# Patient Record
Sex: MALE | Race: WHITE | URBAN - METROPOLITAN AREA
[De-identification: names, ages, dates, MRNs, and addresses within clinical notes are randomized per-mention and may not be internally consistent; named-entity substitution may affect disease eponyms.]

---

## 2018-04-12 ENCOUNTER — OFFICE VISIT (OUTPATIENT)
Dept: URGENT CARE | Facility: URGENT CARE | Age: 43
End: 2018-04-12
Payer: COMMERCIAL

## 2018-04-12 ENCOUNTER — HOSPITAL ENCOUNTER (EMERGENCY)
Facility: CLINIC | Age: 43
Discharge: HOME | End: 2018-04-12
Attending: EMERGENCY MEDICINE | Admitting: EMERGENCY MEDICINE
Payer: COMMERCIAL

## 2018-04-12 VITALS
BODY MASS INDEX: 25.31 KG/M2 | TEMPERATURE: 97.7 F | DIASTOLIC BLOOD PRESSURE: 99 MMHG | SYSTOLIC BLOOD PRESSURE: 131 MMHG | WEIGHT: 191 LBS | HEIGHT: 73 IN | RESPIRATION RATE: 9 BRPM | OXYGEN SATURATION: 97 %

## 2018-04-12 DIAGNOSIS — R06.02 SOB (SHORTNESS OF BREATH): ICD-10-CM

## 2018-04-12 DIAGNOSIS — R00.0 TACHYCARDIA: Primary | ICD-10-CM

## 2018-04-12 DIAGNOSIS — R55 SYNCOPE, UNSPECIFIED SYNCOPE TYPE: ICD-10-CM

## 2018-04-12 DIAGNOSIS — T73.3XXA FATIGUE DUE TO EXCESSIVE EXERTION, INITIAL ENCOUNTER: ICD-10-CM

## 2018-04-12 LAB
ANION GAP SERPL CALCULATED.3IONS-SCNC: 10 MMOL/L (ref 3–14)
ANION GAP SERPL CALCULATED.3IONS-SCNC: 11 MMOL/L (ref 3–14)
BASOPHILS # BLD AUTO: 0 10E9/L (ref 0–0.2)
BASOPHILS NFR BLD AUTO: 0.3 %
BUN SERPL-MCNC: 13 MG/DL (ref 7–30)
BUN SERPL-MCNC: 13 MG/DL (ref 7–30)
CALCIUM SERPL-MCNC: 8.5 MG/DL (ref 8.5–10.1)
CALCIUM SERPL-MCNC: 8.6 MG/DL (ref 8.5–10.1)
CHLORIDE SERPL-SCNC: 108 MMOL/L (ref 94–109)
CHLORIDE SERPL-SCNC: 109 MMOL/L (ref 94–109)
CO2 SERPL-SCNC: 23 MMOL/L (ref 20–32)
CO2 SERPL-SCNC: 23 MMOL/L (ref 20–32)
CREAT SERPL-MCNC: 0.88 MG/DL (ref 0.66–1.25)
CREAT SERPL-MCNC: 0.91 MG/DL (ref 0.66–1.25)
DIFFERENTIAL METHOD BLD: NORMAL
EOSINOPHIL # BLD AUTO: 0.1 10E9/L (ref 0–0.7)
EOSINOPHIL NFR BLD AUTO: 1 %
ERYTHROCYTE [DISTWIDTH] IN BLOOD BY AUTOMATED COUNT: 13.4 % (ref 10–15)
GFR SERPL CREATININE-BSD FRML MDRD: >90 ML/MIN/1.7M2
GFR SERPL CREATININE-BSD FRML MDRD: >90 ML/MIN/1.7M2
GLUCOSE SERPL-MCNC: 138 MG/DL (ref 70–99)
GLUCOSE SERPL-MCNC: 98 MG/DL (ref 70–99)
HCT VFR BLD AUTO: 40.5 % (ref 40–53)
HGB BLD-MCNC: 14.3 G/DL (ref 13.3–17.7)
HGB BLD-MCNC: 14.7 G/DL (ref 13.3–17.7)
IMM GRANULOCYTES # BLD: 0 10E9/L (ref 0–0.4)
IMM GRANULOCYTES NFR BLD: 0.3 %
INTERPRETATION ECG - MUSE: NORMAL
LYMPHOCYTES # BLD AUTO: 1.4 10E9/L (ref 0.8–5.3)
LYMPHOCYTES NFR BLD AUTO: 18.1 %
MCH RBC QN AUTO: 31.2 PG (ref 26.5–33)
MCHC RBC AUTO-ENTMCNC: 35.3 G/DL (ref 31.5–36.5)
MCV RBC AUTO: 88 FL (ref 78–100)
MONOCYTES # BLD AUTO: 0.5 10E9/L (ref 0–1.3)
MONOCYTES NFR BLD AUTO: 6.9 %
NEUTROPHILS # BLD AUTO: 5.7 10E9/L (ref 1.6–8.3)
NEUTROPHILS NFR BLD AUTO: 73.4 %
NRBC # BLD AUTO: 0 10*3/UL
NRBC BLD AUTO-RTO: 0 /100
PLATELET # BLD AUTO: 256 10E9/L (ref 150–450)
POTASSIUM SERPL-SCNC: 3.3 MMOL/L (ref 3.4–5.3)
POTASSIUM SERPL-SCNC: 3.5 MMOL/L (ref 3.4–5.3)
RBC # BLD AUTO: 4.59 10E12/L (ref 4.4–5.9)
SODIUM SERPL-SCNC: 142 MMOL/L (ref 133–144)
SODIUM SERPL-SCNC: 142 MMOL/L (ref 133–144)
TROPONIN I SERPL-MCNC: <0.015 UG/L (ref 0–0.04)
TSH SERPL DL<=0.005 MIU/L-ACNC: 1.31 MU/L (ref 0.4–4)
WBC # BLD AUTO: 7.9 10E9/L (ref 4–11)

## 2018-04-12 PROCEDURE — 93000 ELECTROCARDIOGRAM COMPLETE: CPT | Performed by: FAMILY MEDICINE

## 2018-04-12 PROCEDURE — 99284 EMERGENCY DEPT VISIT MOD MDM: CPT | Mod: 25

## 2018-04-12 PROCEDURE — 85025 COMPLETE CBC W/AUTO DIFF WBC: CPT | Performed by: PHYSICIAN ASSISTANT

## 2018-04-12 PROCEDURE — 84443 ASSAY THYROID STIM HORMONE: CPT | Performed by: FAMILY MEDICINE

## 2018-04-12 PROCEDURE — 25000128 H RX IP 250 OP 636: Performed by: PHYSICIAN ASSISTANT

## 2018-04-12 PROCEDURE — 99203 OFFICE O/P NEW LOW 30 MIN: CPT | Performed by: FAMILY MEDICINE

## 2018-04-12 PROCEDURE — 80048 BASIC METABOLIC PNL TOTAL CA: CPT | Performed by: PHYSICIAN ASSISTANT

## 2018-04-12 PROCEDURE — 96360 HYDRATION IV INFUSION INIT: CPT

## 2018-04-12 PROCEDURE — 93005 ELECTROCARDIOGRAM TRACING: CPT

## 2018-04-12 PROCEDURE — 36415 COLL VENOUS BLD VENIPUNCTURE: CPT | Performed by: FAMILY MEDICINE

## 2018-04-12 PROCEDURE — 85018 HEMOGLOBIN: CPT | Performed by: FAMILY MEDICINE

## 2018-04-12 PROCEDURE — 84484 ASSAY OF TROPONIN QUANT: CPT | Performed by: PHYSICIAN ASSISTANT

## 2018-04-12 PROCEDURE — 80048 BASIC METABOLIC PNL TOTAL CA: CPT | Performed by: FAMILY MEDICINE

## 2018-04-12 RX ORDER — SODIUM CHLORIDE 9 MG/ML
1000 INJECTION, SOLUTION INTRAVENOUS CONTINUOUS
Status: DISCONTINUED | OUTPATIENT
Start: 2018-04-12 | End: 2018-04-12 | Stop reason: HOSPADM

## 2018-04-12 RX ORDER — DEXTROAMPHETAMINE SACCHARATE, AMPHETAMINE ASPARTATE, DEXTROAMPHETAMINE SULFATE AND AMPHETAMINE SULFATE 2.5; 2.5; 2.5; 2.5 MG/1; MG/1; MG/1; MG/1
TABLET ORAL
COMMUNITY
Start: 2018-03-26

## 2018-04-12 RX ADMIN — SODIUM CHLORIDE 1000 ML: 9 INJECTION, SOLUTION INTRAVENOUS at 20:40

## 2018-04-12 ASSESSMENT — ENCOUNTER SYMPTOMS
CHEST TIGHTNESS: 0
LIGHT-HEADEDNESS: 0
HEADACHES: 0
NAUSEA: 0
ABDOMINAL DISTENTION: 0
VOMITING: 0
NUMBNESS: 0
PALPITATIONS: 0
FEVER: 0
DIZZINESS: 0
FACIAL ASYMMETRY: 0
WEAKNESS: 0
SHORTNESS OF BREATH: 0
CHILLS: 0
ARTHRALGIAS: 0
ABDOMINAL PAIN: 0

## 2018-04-12 NOTE — MR AVS SNAPSHOT
"              After Visit Summary   2018    Deven Amanda    MRN: [id number]           Patient Information     Date Of Birth          1975        Visit Information        Provider Department      2018 7:25 PM Vincent Nova,  [facility]        Today's Diagnoses     Tachycardia    -  1    SOB (shortness of breath)        Syncope, unspecified syncope type           Follow-ups after your visit        Who to contact     If you have questions or need follow up information about today's clinic visit or your schedule please contact [facility] directly at [phone].  Normal or non-critical lab and imaging results will be communicated to you by TouristWayhart, letter or phone within 4 business days after the clinic has received the results. If you do not hear from us within 7 days, please contact the clinic through TouristWayhart or phone. If you have a critical or abnormal lab result, we will notify you by phone as soon as possible.  Submit refill requests through [employer] or call your pharmacy and they will forward the refill request to us. Please allow 3 business days for your refill to be completed.          Additional Information About Your Visit        MyChart Information     [employer] lets you send messages to your doctor, view your test results, renew your prescriptions, schedule appointments and more. To sign up, go to [email or web address] . Click on \"Log in\" on the left side of the screen, which will take you to the Welcome page. Then click on \"Sign up Now\" on the right side of the page.     You will be asked to enter the access code listed below, as well as some personal information. Please follow the directions to create your username and password.     Your access code is: [email or web address]  Expires: 2018  8:11 PM     Your access code will  in 90 days. If you need help or a new code, please call your [place] clinic or [phone].        Care " EveryWhere ID     This is your Care EveryWhere ID. This could be used by other organizations to access your [facility] medical records  [id number]         Blood Pressure from Last 3 Encounters:   No data found for BP    Weight from Last 3 Encounters:   No data found for Wt              We Performed the Following     Basic metabolic panel     EKG 12-lead complete w/read - Clinics     Hemoglobin     TSH with free T4 reflex        Primary Care Provider Fax #    Physician No Ref-Primary [phone]       No address on file        Equal Access to Services     JOSEPH FRAUSTO : Hadii aad ku hadasho Soomaali, waaxda luqadaha, qaybta kaalmada adeegyada, waxay idiin hayaan adeeg vinduglasfreddie guerra . So [facility] [phone].    ATENCIÓN: Si habla español, tiene a joel disposición servicios gratuitos de asistencia lingüística. Llame al [phone].    We comply with applicable federal civil rights laws and [place] laws. We do not discriminate on the basis of race, color, national origin, age, disability, sex, sexual orientation, or gender identity.            Thank you!     Thank you for choosing [facility]  for your care. Our goal is always to provide you with excellent care. Hearing back from our patients is one way we can continue to improve our services. Please take a few minutes to complete the written survey that you may receive in the mail after your visit with us. Thank you!             Your Updated Medication List - Protect others around you: Learn how to safely use, store and throw away your medicines at www.disposemymeds.org.      Notice  As of 4/12/2018  8:11 PM    You have not been prescribed any medications.

## 2018-04-12 NOTE — ED AVS SNAPSHOT
Emergency Department    [address] [facility] [address]    Phone:  [phone]    Fax:  [phone]                                       Deven Amanda   MRN: [id number]    Department:   Emergency Department   Date of Visit:  4/12/2018           After Visit Summary Signature Page     I have received my discharge instructions, and my questions have been answered. I have discussed any challenges I see with this plan with the nurse or doctor.    ..........................................................................................................................................  Patient/Patient Representative Signature      ..........................................................................................................................................  Patient Representative Print Name and Relationship to Patient    ..................................................               ................................................  Date                                            Time    ..........................................................................................................................................  Reviewed by Signature/Title    ...................................................              ..............................................  Date                                                            Time

## 2018-04-12 NOTE — ED AVS SNAPSHOT
Emergency Department    [address] [facility] [address]    Phone:  [phone]    Fax:  [phone]                                       Deven Amanda   MRN: [id number]    Department:   Emergency Department   Date of Visit:  4/12/2018           Patient Information     Date Of Birth          1975        Your diagnoses for this visit were:     Fatigue due to excessive exertion, initial encounter        You were seen by Britton Goyal DO.      Follow-up Information     Schedule an appointment as soon as possible for a visit with [facility].    Specialty:  Family Medicine    Why:  As needed    Contact information:    [address] [facility] [address] [phone]        Discharge Instructions         Managing Fatigue     Family members can help with meals and chores around the house.   Fatigue is common. It can be caused by worry, lack of sleep, or poor appetite. Fatigue can also be a sign of anemia, a shortage of red blood cells. You might need medical treatment for anemia. The tips below can help you feel better.  Conserving energy    Keep track of the times of day when you are most tired and plan around them. For instance, if you are more tired in the afternoon, try to get tasks done in the morning.    Decide which tasks are most important. Do those first.    Pass tasks along to others when you need to. Ask for help.    Accept help when it s offered. Tell people what they can do to help. For instance, you may need someone to fix a meal, fold clothes, or put gas in your car.    Plan rest times. You may want to take a nap each day. Just sitting quietly for a few minutes can make you feel more rested.  What you can do to feel better    Relax before you try to sleep. Take a bath or read for a while.    Form a sleep pattern. Go to bed at the same time each night and get up at the same time each morning.    Eat well. Choose foods from all of the  food groups each day.    Exercise. Take a brisk walk to help increase your energy.    Avoid caffeine and alcohol. Drink plenty of water or fruit juices instead.  Treating anemia  If you begin to feel more tired than normal, tell your doctor. Fatigue could be a sign of anemia. This problem is fairly common in cancer patients, especially during chemotherapy and radiation treatments. If your red blood cell count is too low, you may get a blood transfusion. In some cases, you may need medicine to increase the number of red blood cells your body makes.  When to call your healthcare provider  Call your healthcare provider if you have:    Shortness of breath or chest pain    A dizzy feeling when you get up from lying or sitting down    Paler skin than normal    Extreme tiredness that is not helped by sleep   Date Last Reviewed: 1/3/2016    3747-7744 [employer]. [address] [place] [address]. All rights reserved. This information is not intended as a substitute for professional medical care. Always follow your healthcare professional's instructions.          24 Hour Appointment Hotline       To make an appointment at any [facility], call [phone] ([phone]). If you don't have a family doctor or clinic, we will help you find one. [facility] are conveniently located to serve the needs of you and your family.             Review of your medicines      Our records show that you are taking the medicines listed below. If these are incorrect, please call your family doctor or clinic.        Dose / Directions Last dose taken    amphetamine-dextroamphetamine 10 MG per tablet   Commonly known as:  ADDERALL        Take 1 Tab by mouth two times a day.  Next appt due July 2018   Refills:  0                Procedures and tests performed during your visit     Basic metabolic panel    CBC with platelets differential    EKG 12-lead, tracing only    Troponin I      Orders Needing Specimen  Collection     None      Pending Results     Date and Time Order Name Status Description    4/12/2018 1928 EKG 12-LEAD COMPLETE W/READ - CLINICS Preliminary             Pending Culture Results     No orders found from 4/10/2018 to 4/13/2018.            Pending Results Instructions     If you had any lab results that were not finalized at the time of your Discharge, you can call the ED Lab Result RN at [phone]. You will be contacted by this team for any positive Lab results or changes in treatment. The nurses are available 7 days a week from 10A to 6:30P.  You can leave a message 24 hours per day and they will return your call.        Test Results From Your Hospital Stay        4/12/2018  9:06 PM      Component Results     Component Value Ref Range & Units Status    WBC 7.9 4.0 - 11.0 10e9/L Final    RBC Count 4.59 4.4 - 5.9 10e12/L Final    Hemoglobin 14.3 13.3 - 17.7 g/dL Final    Hematocrit 40.5 40.0 - 53.0 % Final    MCV 88 78 - 100 fl Final    MCH 31.2 26.5 - 33.0 pg Final    MCHC 35.3 31.5 - 36.5 g/dL Final    RDW 13.4 10.0 - 15.0 % Final    Platelet Count 256 150 - 450 10e9/L Final    Diff Method Automated Method  Final    % Neutrophils 73.4 % Final    % Lymphocytes 18.1 % Final    % Monocytes 6.9 % Final    % Eosinophils 1.0 % Final    % Basophils 0.3 % Final    % Immature Granulocytes 0.3 % Final    Nucleated RBCs 0 0 /100 Final    Absolute Neutrophil 5.7 1.6 - 8.3 10e9/L Final    Absolute Lymphocytes 1.4 0.8 - 5.3 10e9/L Final    Absolute Monocytes 0.5 0.0 - 1.3 10e9/L Final    Absolute Eosinophils 0.1 0.0 - 0.7 10e9/L Final    Absolute Basophils 0.0 0.0 - 0.2 10e9/L Final    Abs Immature Granulocytes 0.0 0 - 0.4 10e9/L Final    Absolute Nucleated RBC 0.0  Final         4/12/2018  9:14 PM      Component Results     Component Value Ref Range & Units Status    Sodium 142 133 - 144 mmol/L Final    Potassium 3.5 3.4 - 5.3 mmol/L Final    Chloride 108 94 - 109 mmol/L Final    Carbon Dioxide 23 20 - 32  mmol/L Final    Anion Gap 11 3 - 14 mmol/L Final    Glucose 98 70 - 99 mg/dL Final    Urea Nitrogen 13 7 - 30 mg/dL Final    Creatinine 0.88 0.66 - 1.25 mg/dL Final    GFR Estimate >90 >60 mL/min/1.7m2 Final    Non  GFR Calc    GFR Estimate If Black >90 >60 mL/min/1.7m2 Final    African American GFR Calc    Calcium 8.5 8.5 - 10.1 mg/dL Final         4/12/2018  9:17 PM      Component Results     Component Value Ref Range & Units Status    Troponin I ES <0.015 0.000 - 0.045 ug/L Final    The 99th percentile for upper reference range is 0.045 ug/L.  Troponin values   in the range of 0.045 - 0.120 ug/L may be associated with risks of adverse   clinical events.                  Clinical Quality Measure: Blood Pressure Screening     Your blood pressure was checked while you were in the emergency department today. The last reading we obtained was  BP: (!) 131/99 . Please read the guidelines below about what these numbers mean and what you should do about them.  If your systolic blood pressure (the top number) is less than 120 and your diastolic blood pressure (the bottom number) is less than 80, then your blood pressure is normal. There is nothing more that you need to do about it.  If your systolic blood pressure (the top number) is 120-139 or your diastolic blood pressure (the bottom number) is 80-89, your blood pressure may be higher than it should be. You should have your blood pressure rechecked within a year by a primary care provider.  If your systolic blood pressure (the top number) is 140 or greater or your diastolic blood pressure (the bottom number) is 90 or greater, you may have high blood pressure. High blood pressure is treatable, but if left untreated over time it can put you at risk for heart attack, stroke, or kidney failure. You should have your blood pressure rechecked by a primary care provider within the next 4 weeks.  If your provider in the emergency department today gave you specific  "instructions to follow-up with your doctor or provider even sooner than that, you should follow that instruction and not wait for up to 4 weeks for your follow-up visit.        Thank you for choosing [facility]       Thank you for choosing [facility] for your care. Our goal is always to provide you with excellent care. Hearing back from our patients is one way we can continue to improve our services. Please take a few minutes to complete the written survey that you may receive in the mail after you visit with us. Thank you!        [employer] Information     [employer] lets you send messages to your doctor, view your test results, renew your prescriptions, schedule appointments and more. To sign up, go to [email or web address] . Click on \"Log in\" on the left side of the screen, which will take you to the Welcome page. Then click on \"Sign up Now\" on the right side of the page.     You will be asked to enter the access code listed below, as well as some personal information. Please follow the directions to create your username and password.     Your access code is: [email or web address]  Expires: 2018  8:11 PM     Your access code will  in 90 days. If you need help or a new code, please call your [facility] clinic or [phone].        Care EveryWhere ID     This is your Care EveryWhere ID. This could be used by other organizations to access your [facility] medical records  [id number]        Equal Access to Services     JOSEPH FRAUSTO AH: Hadii renee Kennedy, nadir sam, qaybmiguel ledesma . So [facility] [phone].    ATENCIÓN: Si habla español, tiene a joel disposición servicios gratuitos de asistencia lingüística. [place] al [phone].    We comply with applicable federal civil rights laws and [place] laws. We do not discriminate on the basis of race, color, national origin, age, disability, sex, sexual orientation, or gender identity.            After Visit " Summary       This is your record. Keep this with you and show to your community pharmacist(s) and doctor(s) at your next visit.

## 2018-04-13 NOTE — ED NOTES
Bed: ED11  Expected date:   Expected time:   Means of arrival:   Comments:  519 42 M dizziness/SOB

## 2018-04-13 NOTE — PROGRESS NOTES
SUBJECTIVE: Deven Amanda is a 42 year old male presenting with a chief complaint of SOB and collapse after chasing his dog that got lose up a hill.  Then tried his wife's alb inhaler twice  Onset of symptoms was 30mins prior to arrival.  Course of illness is worsening with Numbness face and arms   Severity moderate-severe  Current and Associated symptoms: none  Treatment measures tried include Inhaler (name: alb inhaler).  But no hx of asthma  Predisposing factors include None.    No past medical history on file.  Allergies not on file  Social History   Substance Use Topics     Smoking status: Not on file     Smokeless tobacco: Not on file     Alcohol use Not on file       ROS:  SKIN: no rash  GI: no vomiting    OBJECTIVE:  There were no vitals taken for this visit.GENERAL APPEARANCE: healthy, alert and no distress  EYES: EOMI,  PERRL, conjunctiva clear  HENT: ear canals and TM's normal.  Nose and mouth without ulcers, erythema or lesions  NECK: supple, nontender, no lymphadenopathy  RESP: lungs clear to auscultation - no rales, rhonchi or wheezes  CV: regular rates and rhythm, normal S1 S2, no murmur noted  ABDOMEN:  soft, nontender, no HSM or masses and bowel sounds normal  NEURO: Normal strength and tone, sensory exam grossly normal,  normal speech and mentation  SKIN: no suspicious lesions or rashes      ICD-10-CM    1. Tachycardia R00.0 EKG 12-lead complete w/read - Clinics     Hemoglobin     Basic metabolic panel     TSH with free T4 reflex   2. SOB (shortness of breath) R06.02 EKG 12-lead complete w/read - Clinics     Hemoglobin     Basic metabolic panel     TSH with free T4 reflex   3. Syncope, unspecified syncope type R55      911 called for Paramedic Transport to ED  Did not do a CXR as Portable xr upstairs and away from

## 2018-04-13 NOTE — DISCHARGE INSTRUCTIONS
Managing Fatigue     Family members can help with meals and chores around the house.   Fatigue is common. It can be caused by worry, lack of sleep, or poor appetite. Fatigue can also be a sign of anemia, a shortage of red blood cells. You might need medical treatment for anemia. The tips below can help you feel better.  Conserving energy    Keep track of the times of day when you are most tired and plan around them. For instance, if you are more tired in the afternoon, try to get tasks done in the morning.    Decide which tasks are most important. Do those first.    Pass tasks along to others when you need to. Ask for help.    Accept help when it s offered. Tell people what they can do to help. For instance, you may need someone to fix a meal, fold clothes, or put gas in your car.    Plan rest times. You may want to take a nap each day. Just sitting quietly for a few minutes can make you feel more rested.  What you can do to feel better    Relax before you try to sleep. Take a bath or read for a while.    Form a sleep pattern. Go to bed at the same time each night and get up at the same time each morning.    Eat well. Choose foods from all of the food groups each day.    Exercise. Take a brisk walk to help increase your energy.    Avoid caffeine and alcohol. Drink plenty of water or fruit juices instead.  Treating anemia  If you begin to feel more tired than normal, tell your doctor. Fatigue could be a sign of anemia. This problem is fairly common in cancer patients, especially during chemotherapy and radiation treatments. If your red blood cell count is too low, you may get a blood transfusion. In some cases, you may need medicine to increase the number of red blood cells your body makes.  When to call your healthcare provider  Call your healthcare provider if you have:    Shortness of breath or chest pain    A dizzy feeling when you get up from lying or sitting down    Paler skin than normal    Extreme tiredness  that is not helped by sleep   Date Last Reviewed: 1/3/2016    2360-7832 [employer]. [address] [place] [address], [place] [address]. All rights reserved. This information is not intended as a substitute for professional medical care. Always follow your healthcare professional's instructions.

## 2018-04-13 NOTE — ED PROVIDER NOTES
Emergency Department Attending Supervision Note  4/12/2018  9:52 PM    I evaluated this patient in conjunction with MIRA Masters.    Briefly, the patient presented with multiple concerns that started after chasing his dog 1/4 a mile. He normally does not run. He he has symptoms of exhaustion.  He had some tingling in his fingertips after trying his girlfriend's inhaler which did not help his breathing.  The patient feels back to normal at this time.  He also reports a dry mouth and had a hard time speaking because of this.  The patient is otherwise healthy.  He has no risk factors for stroke.  The patient had a slight discomfort in his chest but this is all improved.  He has no cardiac risk factors.    PE/DVT Risk Factors:  The patient denies a personal or family history of PE, DVT, or other clotting disorders. The patient denies any recent travel, surgery, or other prolonged immobilization. The patient denies tobacco use or hormone use.    Cardiac Risk Factors:  The patient denies a history of diabetes, hypertension, high cholesterol, known cardiac disease or current smoking.    On my exam:  Physical Exam   General:  Sitting on bed with girlfriend at bedside, comfortable appearing.   HENT:  No obvious trauma to head  Right Ear:  External ear normal.   Left Ear:  External ear normal.   Nose:  Nose normal.   Eyes:  Conjunctivae and EOM are normal.  Neck: Normal range of motion. Neck supple. No tracheal deviation present.   Pulm/Chest: No respiratory distress  M/S: Normal range of motion.   Neuro: Alert. GCS 15. CN II-XII Grossly intact, no pronator drift, normal finger-nose-finger, visual fields intact by confrontation. Muscle strength is +5 proximal and distal in the bilateral upper and lower extremities. No dysarthria. Normal palm up, palm down.  Skin: Skin is warm and dry. No rash noted. Not diaphoretic.   Psych: Normal mood and affect. Behavior is normal.     Brief MDM:  Deven Amanda is a very pleasant 42 year  old year old male who presents to the emergency department with concern of exhaustion after running after his dog.  The patient went to urgent care who sent in by EMS to the emergency department.  The patient has an unremarkable exam.  There is no focal neurologic deficits to suggest stroke.  I do not believe any advanced imaging of the brain is necessary.  A screening EKG and troponin are both negative.  The patient has no symptoms at this time.  Being that the patient does not normally exercise and had some generalized exhaustion along with the above findings after this, I suspect this is all related to overexertion.  The patient is in agreement with this.  He desires to go home.  I encouraged close follow-up with the primary.  Also encouraged him to return if he has any recurrent symptoms per    The treatment plan was discussed with the patient and they expressed understanding of this plan and consented to the plan.  In addition, the patient will return to the emergency department if their symptoms persist, worsen, if new symptoms arise or if there is any concern as other pathology may be present that is not evident at this time. They also understand the importance of close follow up in the clinic and if unable to do so will return to the emergency department for a reevaluation. All questions were answered.    My Impression and diagnosis:    ICD-10-CM    1. Fatigue due to excessive exertion, initial encounter T73.3XXA          DO Jay Jay Katz Robert James, DO  04/12/18 2157

## 2018-04-13 NOTE — ED PROVIDER NOTES
"  History     Chief Complaint:  Dizziness      HPI   Deven Amanda is a 42 year old male who presents via EMS for evaluation after having a \"collapse\".  Patient was out on a walk with his dog and his girlfriend, dog got away and the patient chased him a pill for approximately a quarter of a mile.  At that point patient was unable to run anymore and \"collapsed\".  He denies any syncope or loss of consciousness, he states that he \"just could not run any further\" and he began to experience a \"tingling\" feeling in his fingertips and his face.  His girlfriend was able to get the dog and they walked home the patient reports some difficulty catching his breath on the walk home.  At home he tried his girlfriend's albuterol inhaler which did not provide any relief.  He states that after returning home and trying the inhaler patient had dysarthria.  He states that he did not have any trouble thinking of words but was unable to verbalize them.  Patient presented to urgent care who called EMS to have him brought to the emergency department for further evaluation.  Patient currently denies any chest pain dyspnea and states that the tingling feeling in his hands has resolved, the tingling feeling in his face has significantly improved already does state there is a small amount of \"tingling\" upon presentation.  He has no cardiac history or risk factors aside from being a former smoker that quit 20 years ago.  No other concerns or complaints today.        Allergies:  No clinical screen- angioedema from injection at [facility]  Unasyn     Medications:    Adderall     Past Medical History:    ADHD    Past Surgical History:    Hernia Repair    Family History:    History reviewed.  No significant family history.     Social History:  The patient was accompanied to the ED by girlfriend.  Smoking Status: former smoker  Smokeless Tobacco: negative  Alcohol Use: positive  Marital Status:   [2]     Review of Systems   Constitutional: " "Negative for chills and fever.   HENT: Negative for congestion.    Respiratory: Negative for chest tightness and shortness of breath.    Cardiovascular: Negative for chest pain and palpitations.   Gastrointestinal: Negative for abdominal distention, abdominal pain, nausea and vomiting.   Musculoskeletal: Negative for arthralgias.   Neurological: Negative for dizziness, facial asymmetry, weakness, light-headedness, numbness and headaches.   All other systems reviewed and are negative.      Physical Exam     Patient Vitals for the past 24 hrs:   BP Temp Heart Rate Resp SpO2 Height Weight   04/12/18 2141 - - 94 9 97 % - -   04/12/18 2130 (!) 131/99 - 98 - 98 % - -   04/12/18 2115 (!) 130/99 - - - - - -   04/12/18 2100 (!) 143/106 - 98 - 97 % - -   04/12/18 2045 (!) 141/100 - - - - - -   04/12/18 2035 (!) 128/101 97.7  F (36.5  C) 100 16 95 % 1.854 m (6' 1\") 86.6 kg (191 lb)   04/12/18 2030 - - 102 - 96 % - -       Physical Exam  Exam:  Constitutional: healthy, alert and no distress  Head: Normocephalic. No masses, lesions, tenderness or abnormalities  Neck: Neck supple. No adenopathy. Thyroid symmetric, normal size.  ENT: ENT exam normal, no neck nodes or sinus tenderness  Cardiovascular: negative, PMI normal. No lifts, heaves, or thrills. RRR. No murmurs, clicks gallops or rub  Respiratory: negative, Percussion normal. Good diaphragmatic excursion. Lungs clear  Gastrointestinal: Abdomen soft, non-tender. BS normal. No masses, organomegaly  : Deferred  Musculoskeletal: extremities normal- no gross deformities noted, gait normal and normal muscle tone  Skin: no suspicious lesions or rashes  Neurologic: Gait normal. Reflexes normal and symmetric. Sensation grossly WNL.  Psychiatric: mentation appears normal and affect normal/bright  Hematologic/Lymphatic/Immunologic: Normal cervical lymph nodes      Emergency Department Course   ECG:  Indication: chest pain equivalent  Completed at 2056.  Read at 2104.   Rate 92 bpm. " "WV interval 154 ms. QRS duration 96 ms. QT/QTc 362/447 ms. P-R-T axes 63 42 46.  Normal sinus rhythm. Normal ECG. No changes noted from past ECG done on 04/12/2018    Imaging:     No imaging indicated during today's evaluation.    Laboratory:  CBC: AWNL. (WBC 7.9, HGB 14.3, )   CMP: AWNL (Creatinine: 0.88)  2117 Troponin I: <0.015    Interventions:  2040 Normal Saline 1,000 mL IV    The patient's symptoms were improved with IV fluids.    Emergency Department Course:  Nursing notes and vitals reviewed. I performed an exam of the patient as documented above.    Blood drawn. This was sent to the lab for further testing, results above.    I reevaluated the patient and provided an update in regards to his ED course.      Findings and plan explained to the Patient. Patient discharged home with instructions regarding supportive care, medications, and reasons to return. The importance of close follow-up was reviewed.       Impression & Plan        Medical Decision Making:  Deven Amanda is a 42 year old male who presents for evaluation after a \"collapse\" after chasing his dog up pill for approximately a quarter mile.  While overexertion was the most likely etiology given the history of this patient, I considered a broad differential for their syncope today including cardiac arrhythmia, ACS, aortic stenosis, HOCM, PE, orthostatic hypotension, drugs, situational, carotid hypersensitivity, seizure, TIA, stroke, vasovagal.   He has no signs of a concerning etiology for syncope at this point.  In addition,he has no family history of sudden death, no chest pain, no seizure activity or post-ictal period, no murmur, and no signs of orthostasis in the ED, no focal neurologic symptoms, and no complaints of concerning headache.  The workup in the ED is negative including negative trop and the physical exam is re-assuring.  Patient has no risk factors for ACS, stroke or PE. Patient's symptoms resolved in the ED with just IV " fluids.  Supportive outpatient management is therefore indicated.        Diagnosis:    ICD-10-CM    1. Fatigue due to excessive exertion, initial encounter T73.3XXA        Disposition:  Discharged to home    Eric Winchester  4/12/2018    EMERGENCY DEPARTMENT       Eric Winchester PA-C  04/12/18 2222